# Patient Record
Sex: MALE | Race: ASIAN | Employment: OTHER | URBAN - METROPOLITAN AREA
[De-identification: names, ages, dates, MRNs, and addresses within clinical notes are randomized per-mention and may not be internally consistent; named-entity substitution may affect disease eponyms.]

---

## 2017-11-14 ENCOUNTER — HOSPITAL ENCOUNTER (OUTPATIENT)
Age: 42
Discharge: HOME | End: 2017-11-14
Payer: COMMERCIAL

## 2017-11-14 VITALS
DIASTOLIC BLOOD PRESSURE: 77 MMHG | OXYGEN SATURATION: 96 % | WEIGHT: 232 LBS | RESPIRATION RATE: 20 BRPM | HEART RATE: 106 BPM | SYSTOLIC BLOOD PRESSURE: 147 MMHG | HEIGHT: 69 IN | TEMPERATURE: 98 F | BODY MASS INDEX: 34.36 KG/M2

## 2017-11-14 DIAGNOSIS — L02.01 ABSCESS OF EXTERNAL CHEEK, LEFT: ICD-10-CM

## 2017-11-14 DIAGNOSIS — L73.9 FOLLICULITIS: Primary | ICD-10-CM

## 2017-11-14 PROCEDURE — 10060 I&D ABSCESS SIMPLE/SINGLE: CPT

## 2017-11-14 PROCEDURE — 99214 OFFICE O/P EST MOD 30 MIN: CPT

## 2017-11-14 PROCEDURE — 99213 OFFICE O/P EST LOW 20 MIN: CPT

## 2017-11-14 RX ORDER — CLINDAMYCIN HYDROCHLORIDE 300 MG/1
300 CAPSULE ORAL 3 TIMES DAILY
Qty: 30 CAPSULE | Refills: 0 | Status: SHIPPED | OUTPATIENT
Start: 2017-11-14 | End: 2017-11-24

## 2017-11-15 NOTE — ED PROVIDER NOTES
Patient Seen in: [facility]    History   Patient presents with:  Bump    Stated Complaint: ABCESS     HPI    46-year-old male who states he noticed a bump on his left cheek approximately 3-4 days ago that he thought was a mosqui Neck: Normal range of motion. Cardiovascular: Normal rate, regular rhythm, normal heart sounds and intact distal pulses. Exam reveals no gallop. No murmur heard. Pulmonary/Chest: Effort normal and breath sounds normal. No respiratory distress.  He ha For wound re-check        Medications Prescribed:  Current Discharge Medication List    START taking these medications    Clindamycin HCl 300 MG Oral Cap  Take 1 capsule (300 mg total) by mouth 3 (three) times daily.   Qty: 30 capsule Refills: 0        I ha

## 2017-12-18 PROBLEM — E66.9 OBESITY (BMI 30-39.9): Status: ACTIVE | Noted: 2017-12-18

## 2017-12-18 PROBLEM — E29.1 HYPOGONADISM IN MALE: Status: ACTIVE | Noted: 2017-12-18

## 2017-12-18 PROBLEM — R79.9 ABNORMAL BLOOD CHEMISTRY: Status: ACTIVE | Noted: 2017-12-18

## 2019-04-27 ENCOUNTER — APPOINTMENT (OUTPATIENT)
Dept: GENERAL RADIOLOGY | Facility: HOSPITAL | Age: 44
End: 2019-04-27
Attending: EMERGENCY MEDICINE
Payer: COMMERCIAL

## 2019-04-27 ENCOUNTER — HOSPITAL ENCOUNTER (EMERGENCY)
Facility: HOSPITAL | Age: 44
Discharge: HOME | End: 2019-04-27
Attending: EMERGENCY MEDICINE
Payer: COMMERCIAL

## 2019-04-27 VITALS
BODY MASS INDEX: 36.73 KG/M2 | DIASTOLIC BLOOD PRESSURE: 83 MMHG | WEIGHT: 248 LBS | OXYGEN SATURATION: 95 % | HEIGHT: 69 IN | RESPIRATION RATE: 16 BRPM | TEMPERATURE: 98 F | SYSTOLIC BLOOD PRESSURE: 152 MMHG | HEART RATE: 115 BPM

## 2019-04-27 DIAGNOSIS — M47.22 OSTEOARTHRITIS OF SPINE WITH RADICULOPATHY, CERVICAL REGION: Primary | ICD-10-CM

## 2019-04-27 PROCEDURE — 99284 EMERGENCY DEPT VISIT MOD MDM: CPT | Performed by: EMERGENCY MEDICINE

## 2019-04-27 PROCEDURE — 99283 EMERGENCY DEPT VISIT LOW MDM: CPT | Performed by: EMERGENCY MEDICINE

## 2019-04-27 PROCEDURE — 72050 X-RAY EXAM NECK SPINE 4/5VWS: CPT | Performed by: EMERGENCY MEDICINE

## 2019-04-27 PROCEDURE — 96372 THER/PROPH/DIAG INJ SC/IM: CPT | Performed by: EMERGENCY MEDICINE

## 2019-04-27 RX ORDER — DEXAMETHASONE SODIUM PHOSPHATE 4 MG/ML
10 VIAL (ML) INJECTION ONCE
Status: COMPLETED | OUTPATIENT
Start: 2019-04-27 | End: 2019-04-27

## 2019-04-27 RX ORDER — PREDNISONE 20 MG/1
40 TABLET ORAL DAILY
Qty: 10 TABLET | Refills: 0 | Status: SHIPPED | OUTPATIENT
Start: 2019-04-27 | End: 2019-05-02

## 2019-04-27 NOTE — ED PROVIDER NOTES
Patient Seen in: [facility] Emergency Department    History   Patient presents with:  Neck Pain (musculoskeletal, neurologic)    Stated Complaint: neck pain radiates to R shoulder. Denies N/T.  Denies injury     HPI    Patient is a pleasant 44-year-o GENERAL: Patient is awake and alert, soup fine on the cart, grimacing with change in position. HEENT: Head is without evidence of trauma. Extraocular muscles are intact. Pupils are equal and reactive to light.  Oropharynx is pink and moist.  NECK: Neck arrange close outpatient follow-up. Additional imaging study with outpatient MRI will likely be required to facilitate ongoing treatment recommendations. Patient was invited to return for any problems, worsening symptoms, or as discussed.     Sanket

## 2019-04-27 NOTE — ED INITIAL ASSESSMENT (HPI)
Pt c/o right sided neck pain that radiates to right shoulder, pain has been intermittent and less intense for past 3-4 weeks, however, today spontaneously became worse, PT denies any sharp movements, traumas or triggers.  PT does have hx lower back sciatica

## 2019-05-13 ENCOUNTER — OFFICE VISIT (OUTPATIENT)
Dept: PAIN CLINIC | Facility: CLINIC | Age: 44
End: 2019-05-13
Payer: COMMERCIAL

## 2019-05-13 ENCOUNTER — TELEPHONE (OUTPATIENT)
Dept: PAIN CLINIC | Facility: CLINIC | Age: 44
End: 2019-05-13

## 2019-05-13 VITALS — OXYGEN SATURATION: 94 % | HEART RATE: 96 BPM | WEIGHT: 260 LBS | BODY MASS INDEX: 38.51 KG/M2 | HEIGHT: 69 IN

## 2019-05-13 DIAGNOSIS — M54.12 CERVICAL RADICULITIS: Primary | ICD-10-CM

## 2019-05-13 DIAGNOSIS — M54.16 LUMBAR RADICULITIS: ICD-10-CM

## 2019-05-13 PROCEDURE — 99214 OFFICE O/P EST MOD 30 MIN: CPT | Performed by: ANESTHESIOLOGY

## 2019-05-13 RX ORDER — COVID-19 ANTIGEN TEST
440 KIT MISCELLANEOUS 2 TIMES DAILY PRN
COMMUNITY

## 2019-05-13 RX ORDER — GABAPENTIN 100 MG/1
CAPSULE ORAL 3 TIMES DAILY
COMMUNITY
Start: 2019-04-23

## 2019-05-13 RX ORDER — CYCLOBENZAPRINE HCL 10 MG
TABLET ORAL
COMMUNITY
Start: 2019-04-23

## 2019-05-13 NOTE — PROGRESS NOTES
Spoke with patient and discussed injection authorization and scheduling process. Reviewed pre-op instructions. Patient verbalized understanding, no further questions at this time. Pre-op instructions provided to patient.

## 2019-05-13 NOTE — PROGRESS NOTES
Location of Pain: bilateral low back, bilateral thighs    Date Pain Began: 1/19/19          Work Related:   No        Receiving Work Comp/Disability:   No    Numeric Rating Scale:  Pain at Present:  4

## 2019-05-13 NOTE — TELEPHONE ENCOUNTER
Medical clearance needed- NO    Pt seen in OV today by Dr. Clarissa Umaña and recommended for TLESI (X 1). Please begin PA process for procedure(s).      Laterality: Left  Level(s): pending MRI Results    Pt informed callback will be given when PA feedback received

## 2019-05-13 NOTE — PROGRESS NOTES
Name: Pavel Shin   : 1/10/1975   DOS: 2019     Chief complaint: Low back pain and neck pain    History of present illness:  Pavel Shin is a 45year old male complaining of pain in the lower back and neck for 5 months.   The patient started disease 5/2/2014   • Shortness of breath 5/2/2014        Current Outpatient Medications:  Naproxen Sodium (ALEVE) 220 MG Oral Cap Take 440 mg by mouth 2 (two) times daily as needed.  Disp:  Rfl:    Multiple Vitamins-Minerals (MENS ONE DAILY OR)  Disp:  Rfl: shortness of breath, chest pain, dyspnea on exertion, ischemia in extremities. Endocrine: Negative. Specifically denies thyroid disorder, diabetes mellitus, osteoporosis or any other endocrine problems.    Respiratory:  Denies shortness of breath, wheezing 5    5  Finger Extension:     5    5  Finger Flexsion:     5    5    Deep Tendon Reflexes:            Biceps:     5   5   Triceps:    5   5   Brachioradialis:               5   5  Sensory Examination:    R   L   C5:     N   N   C6:     N   N   C7 N    Radiology diagnostic studies: MRI of lumbar spine was reviewed. MRI was done outside. MRI picture is not that clear. Sagittal picture of MRI showed L4-L5 large herniated disc. Axial cuts I could not see much at all.     Assessment:  Cervical

## 2019-05-16 ENCOUNTER — TELEPHONE (OUTPATIENT)
Dept: NEUROLOGY | Facility: CLINIC | Age: 44
End: 2019-05-16

## 2019-05-16 NOTE — TELEPHONE ENCOUNTER
Cervical [id number]) and Lumbar [id number]) Denied     Denial Reason:    61852 -Your records show  results of an advanced imaging scan (a detailed picture study). These results showed that your last  study did not adequately check your condition. A repeat study should be performed at the same  facility. It should be performed at no cost to you or your health plan. You do not need another  approval for this repeat study. 08651 -we cannot approve this request. MRI might be supported in the evaluation of suspected or known spinal disease with one of the followin) failure to improve after a recent (within 3 months) 6 week trial of provider-directed treatment with clinical re-evaluation, or 2) any signs or symptoms such as significant motor weakness, malignancy, infection, cauda equina syndrome, for which conservative treatment is not needed. The clinical information received fails to support meeting these requirements and, therefore, the request is not indicated at this time. Provider may request a peer to peer review with the Medical Director who made the decision. To request a peer to peer review, call the [employer] at [phone], Give Case #:[id number]     OR     Appeal Process  If you decide to appeal, your appeal  should include a copy of this denial notice, an explanation of the treatment rationale, and all  supporting documents to be considered, including a copy of any pertinent medical records. Send your appeal to the following address: Chiquis Herman: Provider Resolution Team, [address]  [address], [facility] or Fax #:[phone]

## 2019-05-20 NOTE — TELEPHONE ENCOUNTER
Patient will need to complete conservative therapy - 6 weeks of PT and medication management prior to insurance approval.

## 2019-05-20 NOTE — TELEPHONE ENCOUNTER
Spoke to pt to explain denials below. Pt clarified that previous imaging of cervical spine was an XR, not an MRI. Pt feels the denial does not make sense. Discussed w/ [facility] Gina who stated we can resubmit the cervical MRI request as an XR does not need to be repeated. In regard to denial for the lumbar MRI, pt states he has participated in PT for lumbar issues since January 2019, completed 1 month of NSAID therapy using naproxen 440 mg BID with no relief. Informed pt request will be resubmitted with this information included in an attempt to obtain approval. Pt verbalized understanding.

## 2019-05-22 NOTE — TELEPHONE ENCOUNTER
Dr. Valentín Jett addend his notes to include conservative treatment already completed.  Please re-submit for prior authorization

## 2019-05-22 NOTE — TELEPHONE ENCOUNTER
Cervical MRI  (01721) -Physical Therapy needs to be within the last 3 months,notes do not indicate recent P/T. Denial Reason for Lumbar is not for reasons of conservative treatment;See Below:  Lumbar MRI (69510) -we cannot approve this request. Your records show  results of an advanced imaging scan (a detailed picture study). These results showed that your last  study did not adequately check your condition. A repeat study should be performed at the same  facility. It should be performed at no cost to you or your health plan.  You do not need another  approval for this repeat study

## 2019-05-24 NOTE — TELEPHONE ENCOUNTER
Per patient he is still in PT, paying out of pocket, will have Dr. Evans Zazueta addend the note to reflect that.

## 2019-06-03 NOTE — TELEPHONE ENCOUNTER
Spoke with Mireya Davis at [facility] regarding Denial     Per Shakira Brando can no longer take any additional information nor Peer to Peer can be done since its been past the 14 calender days since Denial on 05/16/19. Only option is an Appeal.     Appeal Process  If you decide to appeal, your appeal  should include a copy of this denial notice, an explanation of the treatment rationale, and all  supporting documents to be considered, including a copy of any pertinent medical records. Send your appeal to the following address: Luis Hermann: Provider Resolution Team, [address]  [address], [facility], [address] or Fax #:[phone]

## 2019-06-10 NOTE — TELEPHONE ENCOUNTER
Appeal letter drafted, submitted to [facility] by fax at [phone] with clinical notes, fax confirmation received.

## 2019-06-11 ENCOUNTER — TELEPHONE (OUTPATIENT)
Dept: SURGERY | Facility: CLINIC | Age: 44
End: 2019-06-11

## 2019-06-11 NOTE — TELEPHONE ENCOUNTER
See TE from 6/11/19. Pt provided new ins info to  staff. Please resubmit PA to new insurance (old insurance is Marion Almanzar, coverage ended 6/1/19).

## 2019-06-11 NOTE — TELEPHONE ENCOUNTER
Spoke to pt who states he missed a call from office this AM. Explained to pt that call was to inform him that he is no longer active w/ [employer]. Call transferred to  to obtain new ins info and have it placed in pt's chart.  Advised pt that once new

## 2019-06-11 NOTE — TELEPHONE ENCOUNTER
Pt was transferred to me to provide new insurance information. Gave information, verified.  Sent TE to prior [address] [place] [address]

## 2019-06-11 NOTE — TELEPHONE ENCOUNTER
Spoke with Em at [facility] [place]    Per Em patient is no longer active with [employer] as of 06/01/19.      Unable to leave voicemail (Full Mailbox) regarding message above, need updated Insurance for PA of MRI's

## 2019-06-12 NOTE — TELEPHONE ENCOUNTER
Started PA with new insurance ([address]) thru [place], Uploaded Clinicals.    Case #:[id number]

## 2019-06-18 ENCOUNTER — TELEPHONE (OUTPATIENT)
Dept: NEUROLOGY | Facility: CLINIC | Age: 44
End: 2019-06-18

## 2019-06-18 NOTE — TELEPHONE ENCOUNTER
HQNGYNEN([id number]) and JDYGSO([id number]) Denied     Denial Reason:    Based on [facility] Guidelines, we cannot approve this request. Your records show  that you have back and/or neck pain. They also show a request for a magnetic resonance imaging  (MRI) scan of your spine. This is a detailed picture study. An MRI scan is supported for your type of  pain if one of the following applies to you. One, you failed to improve following a recent (within 3  months) 6 week trial of doctor prescribed treatment and you had follow up contact with your doctor to  look at your progress after the 6 weeks. Follow up contact may be done by phone, mail, or  messaging. Two, you have severe weakness. Three, you have or have had cancer. Four, you have  an infection. Five, you have damage to a bundle of nerve roots at the lower end of the spinal cord  (cauda equina) causing loss of function of the nerve roots at the bottom of your spine. Your records  do not show that one or more of these apply to you. Provider may request a peer to peer review with the Medical Director who made the decision. To request a peer to peer review, call the [facility] [phone],  Give Case #:[id number]    OR   Appeal Process  If you decide to appeal, your appeal  should include a copy of this denial notice, an explanation of the treatment rationale, and all  supporting documents to be considered, including a copy of any pertinent medical records.   Send your appeal to the following address  [facility]  Attn: Appeals  [address] [facility], [address] [place] [address] [place]  Jose Barber, [address]  Fax Number: [phone]

## 2019-06-18 NOTE — TELEPHONE ENCOUNTER
Mor Agustin from [address] [place] [address] ph# [phone] scheduled a Peer to Peer 6/19 at 10:00 with Dr Amalia Prince January and Dr Alexander Valencia  [id number]

## 2019-06-18 NOTE — TELEPHONE ENCOUNTER
Attempted to reach patient to relay denial below. Mailbox full, unable to leave a message at this time. When returns call please relay MRI denial below and ask patient if he has completed any physical therapy in the last 3 months. Will check with provider regarding Peer to peer. Discussed with referral team and was informed  provider has 3 months to complete Peer to peer.

## 2019-06-19 NOTE — TELEPHONE ENCOUNTER
Dr. Vicente Eli completed peer to peer and MRI of lumbar and cervical spine approved.  Authorization # [id number]

## 2019-06-19 NOTE — TELEPHONE ENCOUNTER
Attempted to reach patient to relay below. Mail box full, unable to leave a message. When returns call please inform below.

## 2019-06-21 NOTE — TELEPHONE ENCOUNTER
Prior authorization request completed for: MRI Cervical and Lumbar Spine   Authorization #[id number]  Authorization dates: 06/12/2019-09/10/2019  CPT codes approved: 40624, 60523

## (undated) NOTE — ED AVS SNAPSHOT
Marshall Mccullough   MRN: [id number]    Department:  [facility] Emergency Department   Date of Visit:  4/27/2019           Disclosure     Insurance plans vary and the physician(s) referred by the ER may not be covered by your plan.  Please contact you tell this physician (or your personal doctor if your instructions are to return to your personal doctor) about any new or lasting problems. The primary care or specialist physician will see patients referred from the [facility] Emergency Department.  Kristina Lacey

## (undated) NOTE — LETTER
[address] [facility], [address], [address] [facility]  [address], [address] [facility] [phone]        Ragini 10, 2019    Patient: César Mills  YOB: 1975  Member ID: [id number]  Case Reference Number: [id number]     To Whom It May Concern:    I am writing to provide additional information to support my claim of treatment of César Mills with MRI Cervical and Lumbar Spine. In brief, treatment of César Mills with MRI Cervical and Lumbar Spine is medically appropriate and necessary and should be a covered and reimbursed service. My records indicate that César Mills is a 45year old male. César Mills has been treated in our clinic since May 2019. At the time of initial consultation, César Mills presented with complaints of a five month history of neck and low back pain. César Mills has  completed physical therapy, interventional pain management including Transforaminal Lumbar Epidural Steroid Injections, and has tried and failed medication management, including tylenol, cyclobenzaprine, gabapentin, Norco and naproxen. Alternative measures taken by patient have failed to provide improvement for patient. Additionally, the requested treatment was denied due to lack of trial of conservative care, however, César Mills has completed numerous attempts of conservative care including physical therapy, medication management and interventional pain management. As these measures have failed to provide any relief or improvement for the patient, the MRI of Cervical and Lumbar Spine are necessary and warranted for coordination of treatment planning for Mr. Juana Ordoñez and has been recommended to complete this course of treatment. Given the patient's history, condition, and data provided to you, it is my professional opinion that treatment of neck and low back pain with MRI Cervical and Lumbar Spine is medically appropriate and necessary.        If this office may be of further assistance, please do not hesitate to contact us. I look forward to receiving your timely response and approval of this claim.      Sincerely,      Jayshree Ragland MD  [facility]  [facility]. [address] [place] [address], [address] [place] [address]  Phone: [phone],HZJAUE 2  Fax: [phone]